# Patient Record
Sex: MALE | Race: WHITE | NOT HISPANIC OR LATINO | Employment: FULL TIME | URBAN - METROPOLITAN AREA
[De-identification: names, ages, dates, MRNs, and addresses within clinical notes are randomized per-mention and may not be internally consistent; named-entity substitution may affect disease eponyms.]

---

## 2023-02-03 ENCOUNTER — OFFICE VISIT (OUTPATIENT)
Dept: FAMILY MEDICINE CLINIC | Facility: CLINIC | Age: 48
End: 2023-02-03
Payer: COMMERCIAL

## 2023-02-03 VITALS
RESPIRATION RATE: 20 BRPM | OXYGEN SATURATION: 96 % | DIASTOLIC BLOOD PRESSURE: 100 MMHG | TEMPERATURE: 97.8 F | HEIGHT: 71 IN | WEIGHT: 267.4 LBS | BODY MASS INDEX: 37.44 KG/M2 | SYSTOLIC BLOOD PRESSURE: 145 MMHG | HEART RATE: 82 BPM

## 2023-02-03 DIAGNOSIS — R63.5 ABNORMAL WEIGHT GAIN: ICD-10-CM

## 2023-02-03 DIAGNOSIS — Z13.1 SCREENING FOR DIABETES MELLITUS: ICD-10-CM

## 2023-02-03 DIAGNOSIS — R35.89 POLYURIA: ICD-10-CM

## 2023-02-03 DIAGNOSIS — R03.0 ELEVATED BLOOD PRESSURE READING WITHOUT DIAGNOSIS OF HYPERTENSION: ICD-10-CM

## 2023-02-03 DIAGNOSIS — E66.09 CLASS 2 OBESITY DUE TO EXCESS CALORIES WITHOUT SERIOUS COMORBIDITY WITH BODY MASS INDEX (BMI) OF 37.0 TO 37.9 IN ADULT: ICD-10-CM

## 2023-02-03 DIAGNOSIS — Z80.42 FAMILY HISTORY OF PROSTATE CANCER: ICD-10-CM

## 2023-02-03 DIAGNOSIS — Z00.00 ANNUAL PHYSICAL EXAM: Primary | ICD-10-CM

## 2023-02-03 DIAGNOSIS — Z13.220 SCREENING CHOLESTEROL LEVEL: ICD-10-CM

## 2023-02-03 DIAGNOSIS — Z12.5 SCREENING FOR PROSTATE CANCER: ICD-10-CM

## 2023-02-03 LAB
BILIRUB BLD-MCNC: NEGATIVE MG/DL
CLARITY, POC: CLEAR
COLOR UR: YELLOW
GLUCOSE UR STRIP-MCNC: NEGATIVE MG/DL
KETONES UR QL: NEGATIVE
LEUKOCYTE EST, POC: NEGATIVE
NITRITE UR-MCNC: NEGATIVE MG/ML
PH UR: 6 [PH] (ref 5–8)
PROT UR STRIP-MCNC: NEGATIVE MG/DL
RBC # UR STRIP: ABNORMAL /UL
SP GR UR: 1.03 (ref 1–1.03)
UROBILINOGEN UR QL: NORMAL

## 2023-02-03 PROCEDURE — 99386 PREV VISIT NEW AGE 40-64: CPT | Performed by: FAMILY MEDICINE

## 2023-02-03 PROCEDURE — 81002 URINALYSIS NONAUTO W/O SCOPE: CPT | Performed by: FAMILY MEDICINE

## 2023-02-03 NOTE — PROGRESS NOTES
"Chief Complaint   Patient presents with   • NP / establish PCP    • Annual Exam   • Polyuria     Concerned because his father had prostate cancer        Subjective      Lisandro Zepeda is a 47 y.o. who presents for annual physical. He also has a specific health concern which is polyuria/nocturia/urgency that has developed in the last month. His father had prostate cancer.    Sleep. 7 hours per night. Does not feel rested.    Diet. Has gained 40 lbs during the pandemic. Drinks 12 Cokes per day.     Exercise. Currently activity has decreased due to right knee injury and recent right shoulder surgery. Usually is very active through employment. 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     The following portions of the patient's history were reviewed and updated as appropriate: allergies, current medications, past family history, past medical history, past social history, past surgical history and problem list.    Review of Systems   Constitutional: Positive for unexpected weight gain.   HENT: Negative.    Eyes: Positive for blurred vision.   Respiratory: Negative.    Cardiovascular: Negative.    Gastrointestinal: Negative.    Endocrine: Negative.    Genitourinary: Positive for frequency, nocturia and urgency.   Musculoskeletal: Positive for back pain.   Skin: Negative.    Allergic/Immunologic: Negative.    Neurological: Negative.    Hematological: Negative.    Psychiatric/Behavioral: Negative.        Objective   Vital Signs:  /100   Pulse 82   Temp 97.8 °F (36.6 °C)   Resp 20   Ht 180.3 cm (71\")   Wt 121 kg (267 lb 6.4 oz)   SpO2 96%   BMI 37.29 kg/m²             Physical Exam  Constitutional:       General: He is not in acute distress.     Appearance: Normal appearance. He is not ill-appearing.   HENT:      Head: Normocephalic and atraumatic.      Right Ear: Tympanic membrane and ear canal normal.      Left Ear: Tympanic membrane and ear canal normal.      Nose: Nose normal.      Mouth/Throat:      Mouth: Mucous " membranes are dry.      Pharynx: Oropharynx is clear. No posterior oropharyngeal erythema.   Eyes:      Extraocular Movements: Extraocular movements intact.      Conjunctiva/sclera: Conjunctivae normal.      Pupils: Pupils are equal, round, and reactive to light.   Cardiovascular:      Rate and Rhythm: Normal rate and regular rhythm.      Pulses: Normal pulses.      Heart sounds: Normal heart sounds.   Pulmonary:      Effort: Pulmonary effort is normal. No respiratory distress.      Breath sounds: Normal breath sounds.   Abdominal:      General: Abdomen is flat. Bowel sounds are normal.      Palpations: Abdomen is soft.      Tenderness: There is no abdominal tenderness.   Genitourinary:     Prostate: Normal.      Rectum: Normal.   Musculoskeletal:         General: Normal range of motion.      Cervical back: Normal range of motion and neck supple.   Lymphadenopathy:      Cervical: No cervical adenopathy.   Skin:     General: Skin is warm and dry.      Capillary Refill: Capillary refill takes less than 2 seconds.   Neurological:      General: No focal deficit present.      Mental Status: He is alert and oriented to person, place, and time. Mental status is at baseline.      Cranial Nerves: No cranial nerve deficit.      Sensory: No sensory deficit.      Motor: No weakness.   Psychiatric:         Mood and Affect: Mood normal.         Behavior: Behavior normal.         Thought Content: Thought content normal.         Judgment: Judgment normal.          Result Review   The following data was reviewed by: Alverto Hampton MD on 02/03/2023:  UA    Urinalysis 2/3/23   Ketones, UA Negative   Leukocytes, UA Negative                         Assessment and Plan  Diagnoses and all orders for this visit:    1. Annual physical exam (Primary)    2. Family history of prostate cancer  -     PSA Screen    3. Polyuria  -     POC Urinalysis Dipstick  -     PSA Screen  -     Urine Culture - Urine, Urine, Clean Catch    4. Screening  cholesterol level  -     Lipid Panel    5. Screening for prostate cancer  -     PSA Screen    6. Screening for diabetes mellitus  -     Hemoglobin A1c    7. Class 2 obesity due to excess calories without serious comorbidity with body mass index (BMI) of 37.0 to 37.9 in adult  -     Comprehensive Metabolic Panel    8. Abnormal weight gain  -     Comprehensive Metabolic Panel    9. Elevated blood pressure reading without diagnosis of hypertension    Plan:  1.  Regarding his overall physical health you benefit from weight loss.  He is aware he is drinking too many sugar sweetened beverages.  This has been increasingly difficult due to  current sedentary lifestyle because injuries.  He believes this will improve once he returns to work.  Discussed use of topiramate for its taste altering mechanism with soft drinks.  We will await lab results  2.  Patient will be screened for prostate cancer with PSA, cholesterol screening will be performed, diabetes  screening will be performed due to his class II obesity and also family history  3.  Reviewed exercise guidelines and ways to implement these with injury  4.  Blood pressure was elevated.  He will return in 4 weeks for repeat assessment  5.  Patient needs colorectal cancer screening and referral can be made  6.  Patient been made aware that his polyuria can be a symptom of diabetes.  He has no glucosuria.  A1c has been ordered.  7.  Patient's weight gain I believe is central to his health.  There may be underlying metabolic derangements.  Even without he would benefit from weight loss.  Topiramate can be used to altered taste for his sugar sweetened beverages addiction.  Bupropion could also be used for his sugar dependence.  His insurance also covers [employer] and this was briefly discussed.        Follow Up  Return in about 4 weeks (around 3/3/2023) for Recheck blood pressure.  Patient was given instructions and counseling regarding his condition or for health maintenance  advice. Please see specific information pulled into the AVS if appropriate.

## 2023-02-04 LAB
ALBUMIN SERPL-MCNC: 4.8 G/DL (ref 3.5–5.2)
ALBUMIN/GLOB SERPL: 1.9 G/DL
ALP SERPL-CCNC: 78 U/L (ref 39–117)
ALT SERPL-CCNC: 40 U/L (ref 1–41)
AST SERPL-CCNC: 27 U/L (ref 1–40)
BILIRUB SERPL-MCNC: 0.5 MG/DL (ref 0–1.2)
BUN SERPL-MCNC: 11 MG/DL (ref 6–20)
BUN/CREAT SERPL: 10.1 (ref 7–25)
CALCIUM SERPL-MCNC: 9.9 MG/DL (ref 8.6–10.5)
CHLORIDE SERPL-SCNC: 104 MMOL/L (ref 98–107)
CHOLEST SERPL-MCNC: 200 MG/DL (ref 0–200)
CO2 SERPL-SCNC: 27 MMOL/L (ref 22–29)
CREAT SERPL-MCNC: 1.09 MG/DL (ref 0.76–1.27)
EGFRCR SERPLBLD CKD-EPI 2021: 84.8 ML/MIN/1.73
GLOBULIN SER CALC-MCNC: 2.5 GM/DL
GLUCOSE SERPL-MCNC: 98 MG/DL (ref 65–99)
HBA1C MFR BLD: 5.4 % (ref 4.8–5.6)
HDLC SERPL-MCNC: 33 MG/DL (ref 40–60)
LDLC SERPL CALC-MCNC: 126 MG/DL (ref 0–100)
POTASSIUM SERPL-SCNC: 4.7 MMOL/L (ref 3.5–5.2)
PROT SERPL-MCNC: 7.3 G/DL (ref 6–8.5)
PSA SERPL-MCNC: 0.93 NG/ML (ref 0–4)
SODIUM SERPL-SCNC: 140 MMOL/L (ref 136–145)
TRIGL SERPL-MCNC: 229 MG/DL (ref 0–150)
VLDLC SERPL CALC-MCNC: 41 MG/DL (ref 5–40)

## 2023-02-05 LAB
BACTERIA UR CULT: NO GROWTH
BACTERIA UR CULT: NORMAL

## 2023-03-03 ENCOUNTER — OFFICE VISIT (OUTPATIENT)
Dept: FAMILY MEDICINE CLINIC | Facility: CLINIC | Age: 48
End: 2023-03-03
Payer: COMMERCIAL

## 2023-03-03 VITALS
TEMPERATURE: 97.8 F | HEIGHT: 71 IN | BODY MASS INDEX: 37.04 KG/M2 | RESPIRATION RATE: 20 BRPM | SYSTOLIC BLOOD PRESSURE: 130 MMHG | DIASTOLIC BLOOD PRESSURE: 90 MMHG | WEIGHT: 264.6 LBS | HEART RATE: 82 BPM | OXYGEN SATURATION: 96 %

## 2023-03-03 DIAGNOSIS — R03.0 ELEVATED BLOOD PRESSURE READING WITHOUT DIAGNOSIS OF HYPERTENSION: Primary | ICD-10-CM

## 2023-03-03 DIAGNOSIS — E66.09 CLASS 2 OBESITY DUE TO EXCESS CALORIES WITHOUT SERIOUS COMORBIDITY WITH BODY MASS INDEX (BMI) OF 37.0 TO 37.9 IN ADULT: ICD-10-CM

## 2023-03-03 DIAGNOSIS — H35.713 CENTRAL SEROUS CHORIORETINOPATHY OF BOTH EYES: ICD-10-CM

## 2023-03-03 PROBLEM — H35.719 CENTRAL SEROUS CHORIORETINOPATHY: Status: ACTIVE | Noted: 2023-03-03

## 2023-03-03 PROCEDURE — 99214 OFFICE O/P EST MOD 30 MIN: CPT | Performed by: FAMILY MEDICINE

## 2023-03-03 RX ORDER — SPIRONOLACTONE 25 MG/1
25 TABLET ORAL 2 TIMES DAILY
COMMUNITY
Start: 2023-02-22

## 2023-03-03 NOTE — PROGRESS NOTES
"Chief Complaint   Patient presents with   • Follow-up   • Hypertension   • FU from retinal specialist      Pt was Rx'd Spironolactone but he did not want to start until you reviewed their notes.        Subjective      Lisandro Zepeda is a 48 y.o. who presents for hypertension follow-up.  However since his visit for hypertension he has seen both an ophthalmologist and a retinal specialist (Dr. Alejandra Lobato and has been diagnosed with central serous chorioretinopathy.  He has been started on spironolactone although has not yet to take the medication as he wanted to discuss this with me in his follow-up.  Because of the changes seen on his eye examination it is also been recommended he have a sleep study as there are ocular changes that are consistent with an underlying diagnosis of sleep apnea.    Objective   Vital Signs:  /90   Pulse 82   Temp 97.8 °F (36.6 °C)   Resp 20   Ht 180.3 cm (70.98\")   Wt 120 kg (264 lb 9.6 oz)   SpO2 96%   BMI 36.92 kg/m²     Physical Exam  Constitutional:       Appearance: Normal appearance.   HENT:      Mouth/Throat:      Mouth: Mucous membranes are moist.      Pharynx: Oropharynx is clear.      Comments: Mallampati-3  Cardiovascular:      Rate and Rhythm: Normal rate and regular rhythm.      Pulses: Normal pulses.      Heart sounds: Normal heart sounds.   Pulmonary:      Effort: Pulmonary effort is normal.      Breath sounds: Normal breath sounds.   Neurological:      Mental Status: He is alert.          Result Review                     Assessment and Plan  Diagnoses and all orders for this visit:    1. Elevated blood pressure reading without diagnosis of hypertension (Primary)  -     Home Sleep Study; Future  -     Basic Metabolic Panel; Future    2. Class 2 obesity due to excess calories without serious comorbidity with body mass index (BMI) of 37.0 to 37.9 in adult  -     Home Sleep Study; Future    3. Central serous chorioretinopathy of both eyes  Overview:  Sees  " Alejandra Reyes. Started Spironolactone 3/2023    Orders:  -     Home Sleep Study; Future  Plan  1.  Patient will take spironolactone as instructed.  Order for BMP has been placed for patient to obtain in 1 week to assess potassium  2.  Home sleep study will be arranged for the patient  3.  Patient will follow-up with retinologist as recommended in 3 months.  He will follow-up with me again in 3 months.  4.  We did discuss possible use of semaglutide for weight loss.  Due to apparent retinal changes I have asked him to discuss this with his eye doctor before his next visit        Follow Up  Return in about 3 months (around 6/3/2023).  Patient was given instructions and counseling regarding his condition or for health maintenance advice. Please see specific information pulled into the AVS if appropriate.

## 2023-03-20 ENCOUNTER — TELEPHONE (OUTPATIENT)
Dept: FAMILY MEDICINE CLINIC | Facility: CLINIC | Age: 48
End: 2023-03-20

## 2023-03-20 NOTE — TELEPHONE ENCOUNTER
If he is not taking the medication he does not need to have labs.  What was the side effect he was experiencing?

## 2023-03-20 NOTE — TELEPHONE ENCOUNTER
Caller: Lisandro Zepeda    Relationship: Self    Best call back number: [phone]    What medications are you currently taking:   Current Outpatient Medications on File Prior to Visit   Medication Sig Dispense Refill   • spironolactone (ALDACTONE) 25 MG tablet Take 1 tablet by mouth 2 (Two) Times a Day.       No current facility-administered medications on file prior to visit.      Which medication are you concerned about: spironolactone (ALDACTONE) 25 MG tablet    Who prescribed you this medication: EYE DOCTOR     What are your concerns: PATIENT STATED THAT HE WAS SUPPOSED TO DO LABS AFTER BEING ON MEDICATION FOR AWHILE.  PATIENT STATED THAT HE STOPPED THE MEDICATION DUE TO SIDE EFFECTS 3/18/23 AND ASKED IF HE SHOULD STILL DO LABS.  PATIENT STATED THAT THE MEDICATION DOES NOT SEEM TO BE HELPING HIS BLOOD PRESSURE

## 2024-01-03 ENCOUNTER — OFFICE VISIT (OUTPATIENT)
Dept: FAMILY MEDICINE CLINIC | Facility: CLINIC | Age: 49
End: 2024-01-03
Payer: COMMERCIAL

## 2024-01-03 VITALS
DIASTOLIC BLOOD PRESSURE: 100 MMHG | HEIGHT: 71 IN | BODY MASS INDEX: 36.37 KG/M2 | RESPIRATION RATE: 14 BRPM | HEART RATE: 81 BPM | WEIGHT: 259.8 LBS | SYSTOLIC BLOOD PRESSURE: 142 MMHG | OXYGEN SATURATION: 97 % | TEMPERATURE: 97.1 F

## 2024-01-03 DIAGNOSIS — U07.1 COVID-19: ICD-10-CM

## 2024-01-03 DIAGNOSIS — R52 BODY ACHES: Primary | ICD-10-CM

## 2024-01-03 LAB
EXPIRATION DATE: ABNORMAL
FLUAV AG UPPER RESP QL IA.RAPID: NOT DETECTED
FLUBV AG UPPER RESP QL IA.RAPID: NOT DETECTED
INTERNAL CONTROL: ABNORMAL
Lab: ABNORMAL
SARS-COV-2 AG UPPER RESP QL IA.RAPID: DETECTED

## 2024-01-03 NOTE — PROGRESS NOTES
Date: 2024   Patient Name: Lisandro Zepeda  : 1975   MRN: [id number]     Chief Complaint:    Chief Complaint   Patient presents with    Fatigue     Known exposure to Flu. Sinus pressure, headache, sore throat, body aches, fever, swabbed for covid/flu yesterday but may have been too soon       History of Present Illness: Lisandro Zepeda is a 48 y.o. male who is here today for Fatigue, body aches, fever 102.5. Went to [facility] yesterday negative for covid and flu, they encouraged patient to be retested today as possibly too early since symptoms only just started. Not taking any OTC medications. Known exposure to influenza      Fatigue  Associated symptoms include congestion, coughing, fatigue, a fever and a sore throat. Pertinent negatives include no abdominal pain, chest pain, chills, myalgias or nausea.            Review of Systems:   Review of Systems   Constitutional:  Positive for fatigue and fever. Negative for chills.   HENT:  Positive for congestion, postnasal drip, rhinorrhea and sore throat. Negative for ear pain and sinus pressure.    Respiratory:  Positive for cough. Negative for shortness of breath and wheezing.    Cardiovascular:  Negative for chest pain.   Gastrointestinal:  Negative for abdominal pain, diarrhea and nausea.   Musculoskeletal:  Negative for myalgias.   Neurological:  Negative for headache.       Past Medical History: History reviewed. No pertinent past medical history.    Past Surgical History:   Past Surgical History:   Procedure Laterality Date    KNEE ARTHROSCOPY W/ MENISCAL REPAIR Right 2018    ROTATOR CUFF REPAIR Right 10/28/2022       Family History:   Family History   Problem Relation Age of Onset    Hypertension Mother     Hypertension Father     Prostate cancer Father        Social History:   Social History     Socioeconomic History    Marital status:    Tobacco Use    Smoking status: Never    Smokeless tobacco: Never   Vaping Use    Vaping Use: Never used  "  Substance and Sexual Activity    Alcohol use: Not Currently    Drug use: Never       Medications:     Current Outpatient Medications:     Nirmatrelvir & Ritonavir, 300mg/100mg, (PAXLOVID) 20 x 150 MG & 10 x 100MG tablet therapy pack tablet, Take 3 tablets by mouth 2 (Two) Times a Day for 5 days., Disp: 30 tablet, Rfl: 0    Allergies:   Allergies   Allergen Reactions    Meloxicam Other (See Comments) and Palpitations     Muscle cramps and heart racing    Spironolactone Palpitations     Muscle cramps and potassium level changes         Physical Exam:  Vital Signs:   Vitals:    01/03/24 1240   BP: 142/100   Pulse: 81   Resp: 14   Temp: 97.1 °F (36.2 °C)   SpO2: 97%   Weight: 118 kg (259 lb 12.8 oz)   Height: 180.3 cm (70.98\")     Body mass index is 36.26 kg/m².     Physical Exam  Vitals and nursing note reviewed.   Constitutional:       Appearance: He is ill-appearing.   HENT:      Head: Normocephalic and atraumatic.      Right Ear: External ear normal. No middle ear effusion. Tympanic membrane is not erythematous or bulging.      Left Ear: External ear normal.  No middle ear effusion. Tympanic membrane is not erythematous or bulging.      Nose: Nose normal. No nasal tenderness or congestion.      Right Turbinates: Not enlarged or swollen.      Left Turbinates: Not enlarged or swollen.      Right Sinus: No maxillary sinus tenderness.      Left Sinus: No maxillary sinus tenderness.      Mouth/Throat:      Mouth: Mucous membranes are moist.      Pharynx: No pharyngeal swelling or posterior oropharyngeal erythema.      Tonsils: No tonsillar exudate.   Eyes:      Pupils: Pupils are equal, round, and reactive to light.   Cardiovascular:      Rate and Rhythm: Normal rate and regular rhythm.   Pulmonary:      Effort: Pulmonary effort is normal.      Breath sounds: Normal breath sounds.   Abdominal:      General: Bowel sounds are normal.   Musculoskeletal:      Cervical back: Normal range of motion and neck supple.   Skin:   "   General: Skin is warm.   Neurological:      General: No focal deficit present.      Mental Status: He is alert and oriented to person, place, and time.   Psychiatric:         Mood and Affect: Mood normal.           Assessment/Plan:   Diagnoses and all orders for this visit:    1. Body aches (Primary)  -     POCT SARS-CoV-2 Antigen RIGO + Flu    2. COVID-19  -     Nirmatrelvir & Ritonavir, 300mg/100mg, (PAXLOVID) 20 x 150 MG & 10 x 100MG tablet therapy pack tablet; Take 3 tablets by mouth 2 (Two) Times a Day for 5 days.  Dispense: 30 tablet; Refill: 0       Positive for COVID  Ordering paxlovid  Increase fluid intake  Take medication as prescribed  Monitor for worsening symptoms  Tylenol and ibuprofen as needed for aches and fever.  Go to the ER for any shortness of breath, chest pains, fever uncontrolled by medications.      Follow Up:   Return if symptoms worsen or fail to improve.    Lorie Palmer. OTONIEL   [facility]

## 2024-01-22 ENCOUNTER — OFFICE VISIT (OUTPATIENT)
Dept: FAMILY MEDICINE CLINIC | Facility: CLINIC | Age: 49
End: 2024-01-22
Payer: COMMERCIAL

## 2024-01-22 VITALS
BODY MASS INDEX: 36.99 KG/M2 | DIASTOLIC BLOOD PRESSURE: 95 MMHG | RESPIRATION RATE: 20 BRPM | OXYGEN SATURATION: 95 % | HEART RATE: 103 BPM | WEIGHT: 264.2 LBS | TEMPERATURE: 97.5 F | SYSTOLIC BLOOD PRESSURE: 138 MMHG | HEIGHT: 71 IN

## 2024-01-22 DIAGNOSIS — J06.9 ACUTE URI: Primary | ICD-10-CM

## 2024-01-22 DIAGNOSIS — Z12.11 COLON CANCER SCREENING: ICD-10-CM

## 2024-01-22 PROCEDURE — 99213 OFFICE O/P EST LOW 20 MIN: CPT | Performed by: FAMILY MEDICINE

## 2024-01-22 RX ORDER — FEXOFENADINE HCL 180 MG/1
180 TABLET ORAL DAILY
COMMUNITY

## 2024-01-22 RX ORDER — AMOXICILLIN 500 MG/1
1000 CAPSULE ORAL 2 TIMES DAILY
Qty: 28 CAPSULE | Refills: 0 | Status: SHIPPED | OUTPATIENT
Start: 2024-01-22

## 2024-01-22 NOTE — PROGRESS NOTES
"Chief Complaint   Patient presents with    hoarseness, fatigue & SOA     Persisting since Covid        Subjective      Lisandro Zepeda is a 48 y.o. who presents for 2 weeks of sore throat, cough, thick clear post nasal drainage without fevers or chills. He had Covid infection 1 week prior to his current symptoms    Objective   Vital Signs:  /95   Pulse 103   Temp 97.5 °F (36.4 °C)   Resp 20   Ht 180.3 cm (70.98\")   Wt 120 kg (264 lb 3.2 oz)   SpO2 95%   BMI 36.87 kg/m²     Physical Exam  Vitals reviewed.   Constitutional:       Appearance: Normal appearance.   HENT:      Head: Normocephalic and atraumatic.      Right Ear: Tympanic membrane normal.      Left Ear: Tympanic membrane normal.      Nose: Congestion present.      Mouth/Throat:      Mouth: Mucous membranes are moist.      Pharynx: Oropharynx is clear. Posterior oropharyngeal erythema present.   Eyes:      Pupils: Pupils are equal, round, and reactive to light.   Cardiovascular:      Rate and Rhythm: Normal rate and regular rhythm.      Pulses: Normal pulses.      Heart sounds: Normal heart sounds.   Pulmonary:      Effort: Pulmonary effort is normal.      Breath sounds: Normal breath sounds.   Musculoskeletal:      Cervical back: No tenderness.   Lymphadenopathy:      Cervical: No cervical adenopathy.   Neurological:      Mental Status: He is alert.          Result Review                     Assessment and Plan  Diagnoses and all orders for this visit:    1. Acute URI (Primary)  Comments:  Start Amoxil due to prolonged course  Orders:  -     amoxicillin (AMOXIL) 500 MG capsule; Take 2 capsules by mouth 2 (Two) Times a Day.  Dispense: 28 capsule; Refill: 0  -     Chlorcyclizine-Pseudoephed 25-60 MG tablet; Take 1 each by mouth Every 8 (Eight) Hours As Needed (nasal congestion and drainage).  Dispense: 42 tablet; Refill: 0    2. Colon cancer screening  -     Cologuard - Stool, Per Rectum; Future            Follow Up  No follow-ups on " file.  Patient was given instructions and counseling regarding his condition or for health maintenance advice. Please see specific information pulled into the AVS if appropriate.

## 2024-01-25 ENCOUNTER — TELEPHONE (OUTPATIENT)
Dept: FAMILY MEDICINE CLINIC | Facility: CLINIC | Age: 49
End: 2024-01-25
Payer: COMMERCIAL

## 2024-01-25 NOTE — TELEPHONE ENCOUNTER
Caller: DANIEL MCKEON    Relationship: Emergency Contact    Best call back number: [phone]       What was the call regarding: PATIENTS WIFE STATES THAT PATIENT CAN NOT TAKE STEROIDS, WHICH WAS SUPPOSED TO DRY HIM OUT DUE TO CONGESTION. WIFE IS ASKING FOR DIFFERENT MEDICATION, THE AMOXICILLIN IS NOT HELPING. PATIENT HAS COMPLETELY LOST HIS VOICE.     Is it okay if the provider responds through MyChart:       [id number] - [address] [phone] [facility] [phone]

## 2024-01-25 NOTE — TELEPHONE ENCOUNTER
I want him to complete the course of medications he was given.  If he has persistent symptoms after please notify the office

## 2024-04-16 ENCOUNTER — OFFICE VISIT (OUTPATIENT)
Dept: FAMILY MEDICINE CLINIC | Facility: CLINIC | Age: 49
End: 2024-04-16
Payer: COMMERCIAL

## 2024-04-16 VITALS
RESPIRATION RATE: 14 BRPM | TEMPERATURE: 97.8 F | HEIGHT: 71 IN | WEIGHT: 261.2 LBS | OXYGEN SATURATION: 98 % | HEART RATE: 90 BPM | DIASTOLIC BLOOD PRESSURE: 82 MMHG | SYSTOLIC BLOOD PRESSURE: 124 MMHG | BODY MASS INDEX: 36.57 KG/M2

## 2024-04-16 DIAGNOSIS — J06.9 ACUTE URI: Primary | ICD-10-CM

## 2024-04-16 PROCEDURE — 99213 OFFICE O/P EST LOW 20 MIN: CPT | Performed by: NURSE PRACTITIONER

## 2024-04-16 RX ORDER — ALBUTEROL SULFATE 90 UG/1
2 AEROSOL, METERED RESPIRATORY (INHALATION) EVERY 4 HOURS PRN
Qty: 6.7 G | Refills: 1 | Status: SHIPPED | OUTPATIENT
Start: 2024-04-16

## 2024-04-16 RX ORDER — DOXYCYCLINE HYCLATE 100 MG/1
100 CAPSULE ORAL 2 TIMES DAILY
Qty: 20 CAPSULE | Refills: 0 | Status: SHIPPED | OUTPATIENT
Start: 2024-04-16 | End: 2024-04-26

## 2024-04-16 NOTE — PROGRESS NOTES
Date: 2024   Patient Name: Lisandro Zepeda  : 1975   MRN: [id number]     Chief Complaint:    Chief Complaint   Patient presents with    Illness     Cough & Congestion started Thursday       History of Present Illness: Lisandro Zepeda is a 49 y.o. male who is here today for Cough, congestion, PND, headaches that started on Thursday.   No fevers, chills, body aches,   Possible exposure to a URI at work  Taking allegra D without relief of symptoms     Illness  Associated symptoms include congestion and coughing.            Review of Systems:   Review of Systems   HENT:  Positive for congestion and postnasal drip.    Respiratory:  Positive for cough.    Neurological:  Positive for headache.       Past Medical History: History reviewed. No pertinent past medical history.    Past Surgical History:   Past Surgical History:   Procedure Laterality Date    KNEE ARTHROSCOPY W/ MENISCAL REPAIR Right 2018    ROTATOR CUFF REPAIR Right 10/28/2022       Family History:   Family History   Problem Relation Age of Onset    Hypertension Mother     Hypertension Father     Prostate cancer Father        Social History:   Social History     Socioeconomic History    Marital status:    Tobacco Use    Smoking status: Never    Smokeless tobacco: Never   Vaping Use    Vaping status: Never Used   Substance and Sexual Activity    Alcohol use: Not Currently    Drug use: Never       Medications:     Current Outpatient Medications:     fexofenadine (ALLEGRA) 180 MG tablet, Take 1 tablet by mouth Daily., Disp: , Rfl:     albuterol sulfate  (90 Base) MCG/ACT inhaler, Inhale 2 puffs Every 4 (Four) Hours As Needed for Wheezing or Shortness of Air., Disp: 6.7 g, Rfl: 1    doxycycline (VIBRAMYCIN) 100 MG capsule, Take 1 capsule by mouth 2 (Two) Times a Day for 10 days., Disp: 20 capsule, Rfl: 0    Allergies:   Allergies   Allergen Reactions    Corticosteroids Other (See Comments)     Avoid due to eye issues    Meloxicam  "Other (See Comments) and Palpitations     Muscle cramps and heart racing    Spironolactone Palpitations     Muscle cramps and potassium level changes         Physical Exam:  Vital Signs:   Vitals:    04/16/24 1132   BP: 124/82   Pulse: 90   Resp: 14   Temp: 97.8 °F (36.6 °C)   SpO2: 98%   Weight: 118 kg (261 lb 3.2 oz)   Height: 180.3 cm (70.98\")     Body mass index is 36.45 kg/m².     Physical Exam  Vitals and nursing note reviewed.   Constitutional:       Appearance: He is not ill-appearing.   HENT:      Head: Normocephalic and atraumatic.      Right Ear: External ear normal. No middle ear effusion. Tympanic membrane is not erythematous or bulging.      Left Ear: External ear normal.  No middle ear effusion. Tympanic membrane is not erythematous or bulging.      Nose: Nose normal. No nasal tenderness or congestion.      Right Turbinates: Not enlarged or swollen.      Left Turbinates: Not enlarged or swollen.      Right Sinus: Maxillary sinus tenderness present.      Left Sinus: Maxillary sinus tenderness present.      Mouth/Throat:      Mouth: Mucous membranes are moist.      Pharynx: No pharyngeal swelling or posterior oropharyngeal erythema.      Tonsils: No tonsillar exudate.   Eyes:      Pupils: Pupils are equal, round, and reactive to light.   Cardiovascular:      Rate and Rhythm: Normal rate and regular rhythm.   Pulmonary:      Effort: Pulmonary effort is normal.      Breath sounds: Normal breath sounds.   Abdominal:      General: Bowel sounds are normal.   Musculoskeletal:      Cervical back: Normal range of motion and neck supple.   Skin:     General: Skin is warm.   Neurological:      General: No focal deficit present.      Mental Status: He is alert and oriented to person, place, and time.   Psychiatric:         Mood and Affect: Mood normal.           Assessment/Plan:   Diagnoses and all orders for this visit:    1. Acute URI (Primary)  -     doxycycline (VIBRAMYCIN) 100 MG capsule; Take 1 capsule by " mouth 2 (Two) Times a Day for 10 days.  Dispense: 20 capsule; Refill: 0  -     albuterol sulfate  (90 Base) MCG/ACT inhaler; Inhale 2 puffs Every 4 (Four) Hours As Needed for Wheezing or Shortness of Air.  Dispense: 6.7 g; Refill: 1         Increase fluid intake  Take medication as prescribed  Monitor for worsening symptoms  Tylenol and ibuprofen as needed for aches and fever.  Go to the ER for any shortness of breath, chest pains, fever uncontrolled by medications.      Follow Up:   Return if symptoms worsen or fail to improve.    Lorie Palmer. OTONIEL   [facility]

## 2025-03-27 ENCOUNTER — TELEPHONE (OUTPATIENT)
Dept: FAMILY MEDICINE CLINIC | Facility: CLINIC | Age: 50
End: 2025-03-27
Payer: COMMERCIAL

## 2025-03-27 NOTE — TELEPHONE ENCOUNTER
SPOUSE OF PATIENT HAS CALLED REQUESTING A CALL BACK ASAP TO LET HER KNOW IF PATIENT IS NEEDING TO KEEP HIS APPOINTMENT ON TODAY. PATIENT IS ONLY NEEDING A REFERRAL TO UROLOGY FOR A VASECTOMY. SPOUSE STATES INSURANCE DOES NOT REQUIRE A REFERRAL. SPOUSE IS REQUESTING A CALL BACK TO ADVISE ON WHETHER OR NOT PATIENT WILL NEED TO KEEP TODAY'S APPOINTMENT IN ORDER TO SEE UROLOGIST.    CALL BACK NUMBER [phone]

## 2025-04-09 ENCOUNTER — OFFICE VISIT (OUTPATIENT)
Dept: UROLOGY | Facility: CLINIC | Age: 50
End: 2025-04-09
Payer: COMMERCIAL

## 2025-04-09 VITALS — WEIGHT: 265 LBS | HEIGHT: 71 IN | BODY MASS INDEX: 37.1 KG/M2

## 2025-04-09 DIAGNOSIS — Z30.09 UNWANTED FERTILITY: Primary | ICD-10-CM

## 2025-04-09 PROCEDURE — 99203 OFFICE O/P NEW LOW 30 MIN: CPT | Performed by: UROLOGY

## 2025-04-09 RX ORDER — FLUTICASONE PROPIONATE 50 MCG
2 SPRAY, SUSPENSION (ML) NASAL DAILY
COMMUNITY

## 2025-04-09 NOTE — PROGRESS NOTES
Office Note Vasectomy Consult     Patient Name: Lisandro Zepeda  : 1975   MRN: [id number]     Chief Complaint:  Elective Sterilization.   Chief Complaint   Patient presents with    Vasectomy Consult       Referring Provider: Referring, Self    History of Present Illness: Lisandro Zepeda is a 50 y.o. male who presents to Urology today with the desire for irreversible, elective sterilization.  He has two girls.  He is  and they have discussed this and would like to move forward.  She has an IUD and would like to have it removed.    No scrotal pain or discomfort.  NO LUTS.  NO dysuria or gross hematuria.  No scrotal surgery or hernia repair.       Subjective      Review of Systems: Review of Systems   I have reviewed the ROS documented by my clinical staff, I have updated appropriately and I agree. Yandy Beaulieu MD    Past Medical History: History reviewed. No pertinent past medical history.    Past Surgical History:   Past Surgical History:   Procedure Laterality Date    KNEE ARTHROSCOPY W/ MENISCAL REPAIR Right 2018    ROTATOR CUFF REPAIR Right 10/28/2022    SHOULDER ARTHROTOMY Right 10/2024       Family History:   Family History   Problem Relation Age of Onset    Hypertension Mother     Hypertension Father     Prostate cancer Father        Social History:   Social History     Socioeconomic History    Marital status:    Tobacco Use    Smoking status: Never    Smokeless tobacco: Never   Vaping Use    Vaping status: Never Used   Substance and Sexual Activity    Alcohol use: Not Currently    Drug use: Never       Medications:     Current Outpatient Medications:     albuterol sulfate  (90 Base) MCG/ACT inhaler, Inhale 2 puffs Every 4 (Four) Hours As Needed for Wheezing or Shortness of Air., Disp: 6.7 g, Rfl: 1    fexofenadine (ALLEGRA) 180 MG tablet, Take 1 tablet by mouth Daily., Disp: , Rfl:     fluticasone (FLONASE) 50 MCG/ACT nasal spray, Administer 2 sprays into the nostril(s) as  "directed by provider Daily., Disp: , Rfl:     Allergies:   Allergies   Allergen Reactions    Corticosteroids Other (See Comments)     Avoid due to eye issues    Meloxicam Other (See Comments) and Palpitations     Muscle cramps and heart racing    Spironolactone Palpitations     Muscle cramps and potassium level changes       Objective     Physical Exam:   Vital Signs:   Vitals:    04/09/25 1048   Weight: 120 kg (265 lb)   Height: 180.3 cm (70.98\")   PainSc: 0-No pain     Body mass index is 36.98 kg/m².     Physical Exam  Vitals and nursing note reviewed.   Constitutional:       General: He is awake. He is not in acute distress.     Appearance: Normal appearance. He is well-developed.   HENT:      Head: Normocephalic and atraumatic.      Right Ear: External ear normal.      Left Ear: External ear normal.      Nose: Nose normal.   Eyes:      Conjunctiva/sclera: Conjunctivae normal.   Pulmonary:      Effort: Pulmonary effort is normal.   Abdominal:      General: There is no distension.      Palpations: Abdomen is soft. There is no mass.      Tenderness: There is no abdominal tenderness. There is no right CVA tenderness, left CVA tenderness, guarding or rebound.      Hernia: No hernia is present. There is no hernia in the left inguinal area or right inguinal area.   Genitourinary:     Pubic Area: No rash.       Penis: Normal.       Testes: Normal.      Rectum: No mass or tenderness. Normal anal tone.      Comments: Bilateral vas palpable  Musculoskeletal:      Cervical back: Normal range of motion.   Lymphadenopathy:      Lower Body: No right inguinal adenopathy. No left inguinal adenopathy.   Skin:     General: Skin is warm.   Neurological:      General: No focal deficit present.      Mental Status: He is alert and oriented to person, place, and time.   Psychiatric:         Behavior: Behavior normal. Behavior is cooperative.         Labs:   Brief Urine Lab Results       None                 Lab Results   Component " "Value Date    GLUCOSE 98 02/03/2023    CALCIUM 9.9 02/03/2023     02/03/2023    K 4.7 02/03/2023    CO2 27.0 02/03/2023     02/03/2023    BUN 11 02/03/2023    CREATININE 1.09 02/03/2023    BCR 10.1 02/03/2023       No results found for: \"WBC\", \"HGB\", \"HCT\", \"MCV\", \"PLT\"    Images:   No Images in the past 120 days found..    Measures:   Tobacco:   Lisandro Zepeda  reports that he has never smoked. He has never used smokeless tobacco.       Urine Incontinence: Patient reports that he is not currently experiencing any symptoms of urinary incontinence.         Assessment / Plan      Assessment/Plan:   Mr. Zepeda is a 50 y.o. male who presented to clinic today for irreversible elective sterilization.  He would like to have this done with some sedation.  I will schedule him at his convenience.      Diagnoses and all orders for this visit:    1. Unwanted fertility (Primary)  -     External Facility Surgical/Procedural Request; Future         Patient Education:   The patient has requested a vasectomy for elective sterilization and the risks and benefits of the procedure were explained at length. The procedure itself was explained and postop followup explained at this point.  We discussed he will need a  to take him home. I extensively reviewed with him the likely postoperative recuperative period as well as the need to continue to use contraception until he is notified by me of his sterility.     He will undergo a semen analysis 3 months post-procedure AND 20 ejaculations before his first semen analysis check. He understands the potential side effects of anesthesia, bleeding, scrotal hematoma, wound infection, epididymo-orchitis, epididymal congestion, chronic testicular pain requiring further intervention/surgery, sperm granuloma, recanalization and risk of sperm return and/or pregnancy  (1 in 2000 as per the AUA guidelines). The patient wishes to proceed with vasectomy.     Follow Up:   Return for " Follow up after procedure.    I spent approximately 30 minutes providing clinical care for this patient; including review of patient's chart and provider documentation, face to face time spent with patient in examination room (obtaining history, performing physical exam, discussing diagnosis and management options), placing orders, and completing patient documentation.     Yandy Beaulieu MD  [facility] [place]

## 2025-06-09 ENCOUNTER — TELEPHONE (OUTPATIENT)
Dept: UROLOGY | Facility: CLINIC | Age: 50
End: 2025-06-09
Payer: COMMERCIAL

## 2025-06-09 NOTE — TELEPHONE ENCOUNTER
Spoke with patient's spouse in regard to vasectomy coming up. She stated they need to have clarification on how much it will cost before the day of. If it is going to cost too much they will not be able to proceed at this time with procedure. Please advise.

## 2025-06-13 ENCOUNTER — OUTSIDE FACILITY SERVICE (OUTPATIENT)
Dept: UROLOGY | Facility: CLINIC | Age: 50
End: 2025-06-13
Payer: COMMERCIAL